# Patient Record
Sex: FEMALE | Race: OTHER | HISPANIC OR LATINO | URBAN - METROPOLITAN AREA
[De-identification: names, ages, dates, MRNs, and addresses within clinical notes are randomized per-mention and may not be internally consistent; named-entity substitution may affect disease eponyms.]

---

## 2023-03-03 ENCOUNTER — EMERGENCY (EMERGENCY)
Facility: HOSPITAL | Age: 33
LOS: 1 days | Discharge: HOME | End: 2023-03-03
Attending: EMERGENCY MEDICINE | Admitting: EMERGENCY MEDICINE
Payer: MEDICAID

## 2023-03-03 VITALS
SYSTOLIC BLOOD PRESSURE: 115 MMHG | DIASTOLIC BLOOD PRESSURE: 66 MMHG | OXYGEN SATURATION: 100 % | TEMPERATURE: 98 F | HEART RATE: 57 BPM | RESPIRATION RATE: 18 BRPM

## 2023-03-03 DIAGNOSIS — Z98.891 HISTORY OF UTERINE SCAR FROM PREVIOUS SURGERY: Chronic | ICD-10-CM

## 2023-03-03 DIAGNOSIS — Z98.890 OTHER SPECIFIED POSTPROCEDURAL STATES: Chronic | ICD-10-CM

## 2023-03-03 LAB
ALBUMIN SERPL ELPH-MCNC: 4.8 G/DL — SIGNIFICANT CHANGE UP (ref 3.3–5)
ALP SERPL-CCNC: 37 U/L — LOW (ref 40–120)
ALT FLD-CCNC: 8 U/L — SIGNIFICANT CHANGE UP (ref 4–33)
ANION GAP SERPL CALC-SCNC: 11 MMOL/L — SIGNIFICANT CHANGE UP (ref 7–14)
AST SERPL-CCNC: 18 U/L — SIGNIFICANT CHANGE UP (ref 4–32)
BASOPHILS # BLD AUTO: 0.08 K/UL — SIGNIFICANT CHANGE UP (ref 0–0.2)
BASOPHILS NFR BLD AUTO: 1.2 % — SIGNIFICANT CHANGE UP (ref 0–2)
BILIRUB SERPL-MCNC: 0.4 MG/DL — SIGNIFICANT CHANGE UP (ref 0.2–1.2)
BLD GP AB SCN SERPL QL: NEGATIVE — SIGNIFICANT CHANGE UP
BUN SERPL-MCNC: 13 MG/DL — SIGNIFICANT CHANGE UP (ref 7–23)
CALCIUM SERPL-MCNC: 9.3 MG/DL — SIGNIFICANT CHANGE UP (ref 8.4–10.5)
CHLORIDE SERPL-SCNC: 104 MMOL/L — SIGNIFICANT CHANGE UP (ref 98–107)
CO2 SERPL-SCNC: 24 MMOL/L — SIGNIFICANT CHANGE UP (ref 22–31)
CREAT SERPL-MCNC: 0.54 MG/DL — SIGNIFICANT CHANGE UP (ref 0.5–1.3)
EGFR: 126 ML/MIN/1.73M2 — SIGNIFICANT CHANGE UP
EOSINOPHIL # BLD AUTO: 0.26 K/UL — SIGNIFICANT CHANGE UP (ref 0–0.5)
EOSINOPHIL NFR BLD AUTO: 3.8 % — SIGNIFICANT CHANGE UP (ref 0–6)
FLUAV AG NPH QL: SIGNIFICANT CHANGE UP
FLUBV AG NPH QL: SIGNIFICANT CHANGE UP
GLUCOSE SERPL-MCNC: 105 MG/DL — HIGH (ref 70–99)
HCG SERPL-ACNC: <5 MIU/ML — SIGNIFICANT CHANGE UP
HCT VFR BLD CALC: 25.3 % — LOW (ref 34.5–45)
HGB BLD-MCNC: 6.2 G/DL — CRITICAL LOW (ref 11.5–15.5)
IANC: 4.01 K/UL — SIGNIFICANT CHANGE UP (ref 1.8–7.4)
IMM GRANULOCYTES NFR BLD AUTO: 0.3 % — SIGNIFICANT CHANGE UP (ref 0–0.9)
INR BLD: 1.11 RATIO — SIGNIFICANT CHANGE UP (ref 0.88–1.16)
LYMPHOCYTES # BLD AUTO: 1.96 K/UL — SIGNIFICANT CHANGE UP (ref 1–3.3)
LYMPHOCYTES # BLD AUTO: 28.9 % — SIGNIFICANT CHANGE UP (ref 13–44)
MCHC RBC-ENTMCNC: 14.8 PG — LOW (ref 27–34)
MCHC RBC-ENTMCNC: 24.5 GM/DL — LOW (ref 32–36)
MCV RBC AUTO: 60.5 FL — LOW (ref 80–100)
MONOCYTES # BLD AUTO: 0.46 K/UL — SIGNIFICANT CHANGE UP (ref 0–0.9)
MONOCYTES NFR BLD AUTO: 6.8 % — SIGNIFICANT CHANGE UP (ref 2–14)
NEUTROPHILS # BLD AUTO: 4.01 K/UL — SIGNIFICANT CHANGE UP (ref 1.8–7.4)
NEUTROPHILS NFR BLD AUTO: 59 % — SIGNIFICANT CHANGE UP (ref 43–77)
NRBC # BLD: 0 /100 WBCS — SIGNIFICANT CHANGE UP (ref 0–0)
NRBC # FLD: 0 K/UL — SIGNIFICANT CHANGE UP (ref 0–0)
PLATELET # BLD AUTO: 485 K/UL — HIGH (ref 150–400)
POTASSIUM SERPL-MCNC: 3.8 MMOL/L — SIGNIFICANT CHANGE UP (ref 3.5–5.3)
POTASSIUM SERPL-SCNC: 3.8 MMOL/L — SIGNIFICANT CHANGE UP (ref 3.5–5.3)
PROT SERPL-MCNC: 7.3 G/DL — SIGNIFICANT CHANGE UP (ref 6–8.3)
PROTHROM AB SERPL-ACNC: 12.9 SEC — SIGNIFICANT CHANGE UP (ref 10.5–13.4)
RBC # BLD: 4.18 M/UL — SIGNIFICANT CHANGE UP (ref 3.8–5.2)
RBC # FLD: 21.5 % — HIGH (ref 10.3–14.5)
RH IG SCN BLD-IMP: POSITIVE — SIGNIFICANT CHANGE UP
RH IG SCN BLD-IMP: POSITIVE — SIGNIFICANT CHANGE UP
RSV RNA NPH QL NAA+NON-PROBE: SIGNIFICANT CHANGE UP
SARS-COV-2 RNA SPEC QL NAA+PROBE: SIGNIFICANT CHANGE UP
SODIUM SERPL-SCNC: 139 MMOL/L — SIGNIFICANT CHANGE UP (ref 135–145)
WBC # BLD: 6.79 K/UL — SIGNIFICANT CHANGE UP (ref 3.8–10.5)
WBC # FLD AUTO: 6.79 K/UL — SIGNIFICANT CHANGE UP (ref 3.8–10.5)

## 2023-03-03 PROCEDURE — 99223 1ST HOSP IP/OBS HIGH 75: CPT

## 2023-03-03 NOTE — ED CDU PROVIDER INITIAL DAY NOTE - OBJECTIVE STATEMENT
32-year-old female with history of anemia, fibroids, heavy menses presenting to the emergency department from Dr. Ramsay's office for blood transfusion for HGB 6.1.  Patient states she is currently on her third day of menses reports using 4-6 pads per day.  Patient states anemia secondary to heavy vaginal bleeding minimal bleeding at present time.  Patient reports mild shortness of breath and lightheadedness when walking up stairs and with exertional activity denies at present time.  Patient denies fevers, chills, abdominal pain, pelvic pain, headache, chest pain, change in stool, rectal bleeding, nausea, vomiting.  H&P obtained via  ID# [id number].    PITER Parson Note-----  ED Provider HPI as above, reviewed.  Pt is a 31 yo female, PMH anemia, fibroids, heavy menses, who presented to the ED c/o generalized fatigue/lightheadedness 32-year-old female with history of anemia, fibroids, heavy menses presenting to the emergency department from Dr. Ramsay's office for blood transfusion for HGB 6.1.  Patient states she is currently on her third day of menses reports using 4-6 pads per day.  Patient states anemia secondary to heavy vaginal bleeding minimal bleeding at present time.  Patient reports mild shortness of breath and lightheadedness when walking up stairs and with exertional activity denies at present time.  Patient denies fevers, chills, abdominal pain, pelvic pain, headache, chest pain, change in stool, rectal bleeding, nausea, vomiting.  H&P obtained via  ID# [id number].    CDU MARIA DE JESUS Parson Note-----  ED Provider HPI as above, reviewed.  Pt is a 31 yo female, PMH anemia, fibroids, heavy menses, who presented to the ED c/o generalized fatigue/lightheadedness, found to have Hb 6.1 on outpatient labs; directed to the ED for transfusion by Heme/Onc Dr. Ramsay.  In the ED, VSS; WBC 6.79, Hb 6.2, Hct 25.3, platelet count 485; INR 1.11, CMP unremarkable, BHCG <5, COVID/FLU/RSV: NotDetected.  Pt was dispo'd to CDU for continued care plan:  Transfuse PRBCs per orders, repeat CBC, supportive care, general observation care / monitoring.

## 2023-03-03 NOTE — ED ADULT NURSE NOTE - DOES PATIENT HAVE ADVANCE DIRECTIVE
Peripheral IV  Performed by: Yolanda Cassidy MD  Authorized by: Yolanda Cassidy MD     Size:  22 G  Laterality:  Left  Location:  Hand  Site Prep:  Chlorhexidine gluconate w/ alcohol  Technique:  Anatomical landmarks  Attempts:  1  Performed By:  Other         No

## 2023-03-03 NOTE — ED PROVIDER NOTE - PROGRESS NOTE DETAILS
MARIA DE JESUS Ojeda: consent for blood obtained and placed in the chart, RN Sunshine Ocampo translating all questions answered.  S/w Dr Manriquez covering for Dr Ramsay agreeable with plan for CDU to receive blood transfusion.  Pt accepted by MARIA DE JESUS Parson.  Pt aware and agreeable with plan.

## 2023-03-03 NOTE — ED PROVIDER NOTE - NS ED ATTENDING STATEMENT MOD
This was a shared visit with the ALVAREZ. I reviewed and verified the documentation and independently performed the documented:

## 2023-03-03 NOTE — ED ADULT NURSE REASSESSMENT NOTE - NS ED NURSE REASSESS COMMENT FT1
Pt alert and orientedx4, in no apparent distress. Pt denies chest pain, SOB, lightheadedness, pain. Pt is going to CDU for blood transfusions. Call bell within reach, bed in lowest position, will continue to monitor.

## 2023-03-03 NOTE — ED ADULT NURSE NOTE - OBJECTIVE STATEMENT
pt received at intake AAO x 3. pt primarily [place] speaking, able to speak [place] with pt. pt states she was advised by hematologist to come to ED for blood transfusion. pt reports low hemoglobin, BRISENO and dizziness x 5 months. pt denies chest pain, n/v/d, fevers, chills. RR even and unlabored. 20g iv placed to right ac.

## 2023-03-03 NOTE — ED ADULT TRIAGE NOTE - CHIEF COMPLAINT QUOTE
Pt reporting to the ED for dizziness, SOB for months. Sent into Ed form pcp for blood transfusion, hbg 6.1. Reports menstruating at this time saturating 6 pads per day. no chest pain. awaiting ekg.

## 2023-03-03 NOTE — ED CDU PROVIDER INITIAL DAY NOTE - NSICDXPASTMEDICALHX_GEN_ALL_CORE_FT
PAST MEDICAL HISTORY:  Anemia     Fibroids      PAST MEDICAL HISTORY:  Anemia     Fibroids     Heavy menses

## 2023-03-03 NOTE — ED PROVIDER NOTE - OBJECTIVE STATEMENT
32-year-old female with history of anemia, fibroids, heavy menses presenting to the emergency department from Dr. Ramsay's office for blood transfusion for HGB 6.1.  Patient states she is currently on her third day of menses reports using 4-6 pads per day.  Patient states anemia secondary to heavy vaginal bleeding minimal bleeding at present time.  Patient reports mild shortness of breath and lightheadedness when walking up stairs and with exertional activity denies at present time.  Patient denies fevers, chills, abdominal pain, pelvic pain, headache, chest pain, change in stool, rectal bleeding, nausea, vomiting.  H&P obtained via  ID# [id number].

## 2023-03-03 NOTE — ED PROVIDER NOTE - ATTENDING APP SHARED VISIT CONTRIBUTION OF CARE
I performed a face-to-face evaluation of the patient and performed a history and physical examination. I agree with the history and physical examination. If this was a PA visit, I personally saw the patient with the PA and performed a substantive portion of the visit including all aspects of the medical decision making.    Heavy menses. C/o dizziness, SOB x months. Sent from PCP PRBCs b/c Hb 6.1. No anemia symptoms (for example, shortness of breath, dizziness, or chest pain). Check Hb. Likely CDU for PRBCs.

## 2023-03-04 VITALS
RESPIRATION RATE: 17 BRPM | HEART RATE: 75 BPM | SYSTOLIC BLOOD PRESSURE: 107 MMHG | DIASTOLIC BLOOD PRESSURE: 57 MMHG | OXYGEN SATURATION: 100 % | TEMPERATURE: 99 F

## 2023-03-04 LAB
BASOPHILS # BLD AUTO: 0.1 K/UL — SIGNIFICANT CHANGE UP (ref 0–0.2)
BASOPHILS NFR BLD AUTO: 1.5 % — SIGNIFICANT CHANGE UP (ref 0–2)
EOSINOPHIL # BLD AUTO: 0.27 K/UL — SIGNIFICANT CHANGE UP (ref 0–0.5)
EOSINOPHIL NFR BLD AUTO: 3.9 % — SIGNIFICANT CHANGE UP (ref 0–6)
HCT VFR BLD CALC: 34.5 % — SIGNIFICANT CHANGE UP (ref 34.5–45)
HGB BLD-MCNC: 9.4 G/DL — LOW (ref 11.5–15.5)
IANC: 4.2 K/UL — SIGNIFICANT CHANGE UP (ref 1.8–7.4)
IMM GRANULOCYTES NFR BLD AUTO: 0.3 % — SIGNIFICANT CHANGE UP (ref 0–0.9)
LYMPHOCYTES # BLD AUTO: 1.9 K/UL — SIGNIFICANT CHANGE UP (ref 1–3.3)
LYMPHOCYTES # BLD AUTO: 27.6 % — SIGNIFICANT CHANGE UP (ref 13–44)
MCHC RBC-ENTMCNC: 17.9 PG — LOW (ref 27–34)
MCHC RBC-ENTMCNC: 27.2 GM/DL — LOW (ref 32–36)
MCV RBC AUTO: 65.6 FL — LOW (ref 80–100)
MONOCYTES # BLD AUTO: 0.39 K/UL — SIGNIFICANT CHANGE UP (ref 0–0.9)
MONOCYTES NFR BLD AUTO: 5.7 % — SIGNIFICANT CHANGE UP (ref 2–14)
NEUTROPHILS # BLD AUTO: 4.2 K/UL — SIGNIFICANT CHANGE UP (ref 1.8–7.4)
NEUTROPHILS NFR BLD AUTO: 61 % — SIGNIFICANT CHANGE UP (ref 43–77)
NRBC # BLD: 0 /100 WBCS — SIGNIFICANT CHANGE UP (ref 0–0)
NRBC # FLD: 0 K/UL — SIGNIFICANT CHANGE UP (ref 0–0)
PLATELET # BLD AUTO: 443 K/UL — HIGH (ref 150–400)
RBC # BLD: 5.26 M/UL — HIGH (ref 3.8–5.2)
RBC # FLD: 25.2 % — HIGH (ref 10.3–14.5)
WBC # BLD: 6.88 K/UL — SIGNIFICANT CHANGE UP (ref 3.8–10.5)
WBC # FLD AUTO: 6.88 K/UL — SIGNIFICANT CHANGE UP (ref 3.8–10.5)

## 2023-03-04 PROCEDURE — 99238 HOSP IP/OBS DSCHRG MGMT 30/<: CPT

## 2023-03-04 NOTE — ED CDU PROVIDER SUBSEQUENT DAY NOTE - HISTORY
31 yo female, PMH anemia, fibroids, heavy menses, who presented to the ED c/o generalized fatigue/lightheadedness, found to have Hb 6.1 on outpatient labs; directed to the ED for transfusion by Heme/Onc Dr. Ramsay.  In the ED, VSS; WBC 6.79, Hb 6.2, Hct 25.3, platelet count 485; INR 1.11, CMP unremarkable, BHCG <5, COVID/FLU/RSV: NotDetected.  Pt was dispo'd to CDU for continued care plan:  Transfuse PRBCs per orders, repeat CBC, supportive care, general observation care / monitoring.  In the interim, pt objectively noted to be resting comfortably; pt has been clinically stable; no issues thus far.  Pt currently receiving PRBC unit #2, being tolerated well thus far.

## 2023-03-04 NOTE — ED CDU PROVIDER SUBSEQUENT DAY NOTE - ATTENDING CONTRIBUTION TO CARE
CDU MD TORRES:  I performed a face to face bedside interview with patient regarding history of present illness, review of symptoms and past medical history. I completed an independent physical exam.  I have discussed patient's plan of care with PA.   I agree with note as stated above, having amended the EMR as needed to reflect my findings. I have discussed the assessment and plan of care.  This includes during the time I functioned as the attending physician for this patient.

## 2023-03-04 NOTE — ED CDU PROVIDER DISPOSITION NOTE - NSFOLLOWUPINSTRUCTIONS_ED_ALL_ED_FT
Follow up with your OBGYN and Hematologist  Take copy of results with you  Worsening, continued or new concerning symptoms return to the emergency department.

## 2023-03-04 NOTE — ED CDU PROVIDER DISPOSITION NOTE - PATIENT PORTAL LINK FT
You can access the FollowMyHealth Patient Portal offered by [facility] by registering at the following website: [email or web address]. By joining [facility]’s FollowMyHealth portal, you will also be able to view your health information using other applications (apps) compatible with our system.

## 2023-03-04 NOTE — ED CDU PROVIDER DISPOSITION NOTE - CLINICAL COURSE
31 y/o female with pmhx of anemia and fibroids presents to ED c/o fatigue and BRISENO. found to have symptomatic anemia. hg was 6.2. pt sent to cdu for 2 units prbc. h/h improved and pt stable for discharge.

## 2023-03-04 NOTE — ED CDU PROVIDER SUBSEQUENT DAY NOTE - PROGRESS NOTE DETAILS
received sign out from MARIA DE JESUS Parson. pt feeling better. h/h improved and stable for dc. Pt resting comfortably.  No complaints.  Denies lightheadedness or weakness.  Repeat h/h results discussed.  Will dc home with pcp f/u.